# Patient Record
Sex: FEMALE | Race: BLACK OR AFRICAN AMERICAN | NOT HISPANIC OR LATINO | Employment: OTHER | URBAN - NONMETROPOLITAN AREA
[De-identification: names, ages, dates, MRNs, and addresses within clinical notes are randomized per-mention and may not be internally consistent; named-entity substitution may affect disease eponyms.]

---

## 2023-11-17 PROBLEM — I27.20 SEVERE PULMONARY HYPERTENSION (MULTI): Status: ACTIVE | Noted: 2023-11-17

## 2023-11-17 PROBLEM — I08.0 MITRAL VALVE STENOSIS AND AORTIC VALVE STENOSIS: Status: ACTIVE | Noted: 2023-11-17

## 2023-11-17 RX ORDER — CHOLECALCIFEROL (VITAMIN D3) 125 MCG
3000 CAPSULE ORAL
COMMUNITY
End: 2024-02-14 | Stop reason: ALTCHOICE

## 2023-11-17 RX ORDER — VITAMIN B COMPLEX
1 CAPSULE ORAL DAILY
COMMUNITY

## 2023-11-17 RX ORDER — WARFARIN 2 MG/1
2 TABLET ORAL
COMMUNITY
Start: 2023-08-14

## 2023-11-17 RX ORDER — PRAVASTATIN SODIUM 40 MG/1
40 TABLET ORAL NIGHTLY
COMMUNITY

## 2023-11-17 RX ORDER — LOSARTAN POTASSIUM 25 MG/1
25 TABLET ORAL DAILY
COMMUNITY
Start: 2023-03-14

## 2023-11-17 RX ORDER — FUROSEMIDE 20 MG/1
20 TABLET ORAL DAILY
COMMUNITY
Start: 2023-10-11

## 2023-11-17 RX ORDER — ATENOLOL 50 MG/1
100 TABLET ORAL
COMMUNITY
Start: 2023-10-02

## 2024-02-14 ENCOUNTER — OFFICE VISIT (OUTPATIENT)
Dept: CARDIOLOGY | Facility: CLINIC | Age: 84
End: 2024-02-14
Payer: MEDICARE

## 2024-02-14 VITALS
HEIGHT: 61 IN | HEART RATE: 52 BPM | DIASTOLIC BLOOD PRESSURE: 76 MMHG | BODY MASS INDEX: 23.6 KG/M2 | SYSTOLIC BLOOD PRESSURE: 144 MMHG | WEIGHT: 125 LBS

## 2024-02-14 DIAGNOSIS — I27.20 SEVERE PULMONARY HYPERTENSION (MULTI): ICD-10-CM

## 2024-02-14 DIAGNOSIS — I05.0 MITRAL VALVE STENOSIS, MODERATE: Primary | ICD-10-CM

## 2024-02-14 DIAGNOSIS — Z79.01 LONG TERM CURRENT USE OF ANTICOAGULANT THERAPY: ICD-10-CM

## 2024-02-14 DIAGNOSIS — I10 ESSENTIAL HYPERTENSION: ICD-10-CM

## 2024-02-14 PROBLEM — I08.0 MITRAL VALVE STENOSIS AND AORTIC VALVE STENOSIS: Status: RESOLVED | Noted: 2023-11-17 | Resolved: 2024-02-14

## 2024-02-14 PROCEDURE — 1159F MED LIST DOCD IN RCRD: CPT | Performed by: NURSE PRACTITIONER

## 2024-02-14 PROCEDURE — 1036F TOBACCO NON-USER: CPT | Performed by: NURSE PRACTITIONER

## 2024-02-14 PROCEDURE — 1160F RVW MEDS BY RX/DR IN RCRD: CPT | Performed by: NURSE PRACTITIONER

## 2024-02-14 PROCEDURE — 3077F SYST BP >= 140 MM HG: CPT | Performed by: NURSE PRACTITIONER

## 2024-02-14 PROCEDURE — 99213 OFFICE O/P EST LOW 20 MIN: CPT | Performed by: NURSE PRACTITIONER

## 2024-02-14 PROCEDURE — 3078F DIAST BP <80 MM HG: CPT | Performed by: NURSE PRACTITIONER

## 2024-02-14 NOTE — LETTER
"February 19, 2024     Emeterio Arce MD  [address]  [place] [address]    Patient: Oneyda Cormier   YOB: 1940   Date of Visit: 2/14/2024       Dear Dr. Emeterio Arce MD:    Thank you for referring Oneyda Cormier to me for evaluation. Below are my notes for this consultation.  If you have questions, please do not hesitate to call me. I look forward to following your patient along with you.       Sincerely,     Thu Ladd, APRN-CNP      CC: No Recipients  ______________________________________________________________________________________    Chief Complaint  \" Doing fine\"    Reason for Visit  6-month follow-up  Patient presents to the office today for outpatient follow-up for mitral stenosis.  Last evaluated in clinic by Dr. Lopez July 2023.  At that time, added Lasix and Coumadin to medical regimen.  Patient has been compliant.    Presents today ambulatory with steady gait.  Patient denies any hospitalizations or significant changes to interval medical history since last office follow-up.     History of Present Illness   Patient is an extremely pleasant 84-year-old female who appears younger than her stated age.  She presents to the office today reportedly doing\" doing pretty good\".  She continues to be aerobically active where she goes to the gym 3 times a week, she is active in [employer].  She cleans her home and goes to the grocery store.  She denies any shortness of breath, there is no orthopnea or PND.  No dizziness lightheadedness.  No chest pain, no palpitations.    Reviewed mitral stenosis, the indication for anticoagulation.  Is tolerating without bleeding diatheses.  She remains asymptomatic, agrees to surveillance echocardiogram.    Patient reports that overall has no complaint(s) of chest pain, chest pressure/discomfort, dyspnea, exertional chest pressure/discomfort, and fatigue    Daily activity:    Goes to the gym 3 times a week, ADLs.  Grocery store, housework.  Denies any " "change in exercise capacity or functional tolerance since last office visit.    Review of Systems   Cardiovascular:  Negative for chest pain, dyspnea on exertion, irregular heartbeat, leg swelling, near-syncope, orthopnea, palpitations, paroxysmal nocturnal dyspnea and syncope.        Visit Vitals  /76 (BP Location: Left arm, Patient Position: Sitting)   Pulse 52   Ht 1.549 m (5' 1\")   Wt 56.7 kg (125 lb)   BMI 23.62 kg/m²   Smoking Status Never   BSA 1.56 m²     Physical Exam  Vitals and nursing note reviewed.   HENT:      Head: Normocephalic.   Cardiovascular:      Rate and Rhythm: Normal rate and regular rhythm.      Heart sounds: Murmur heard.      Diastolic murmur is present with a grade of 2/4.   Pulmonary:      Effort: Pulmonary effort is normal.      Breath sounds: Normal breath sounds.   Abdominal:      Palpations: Abdomen is soft.   Musculoskeletal:      Right lower leg: No edema.      Left lower leg: No edema.   Skin:     General: Skin is warm and dry.   Neurological:      General: No focal deficit present.      Mental Status: She is alert.   Psychiatric:         Mood and Affect: Mood normal.         Behavior: Behavior normal.    No Known Allergies    Current Outpatient Medications   Medication Instructions   • atenolol (TENORMIN) 100 mg, oral, Daily before breakfast   • b complex vitamins capsule 1 capsule, oral, Daily   • calcium carbonate/vitamin D3 (CALCIUM 600 + D,3, ORAL) oral   • furosemide (LASIX) 20 mg, oral, Daily   • losartan (COZAAR) 25 mg, oral, Daily   • pravastatin (PRAVACHOL) 40 mg, oral, Nightly   • warfarin (COUMADIN) 2 mg, oral      Assessment:    Cardiac and Vasculature  Seen in consult Dr. Pena July 2023 due to abnormal echo:  April 2023 TTE  EF 55 to 60%  Left atrium severely dilated  RV dilated  Mitral valve with moderate mitral stenosis  MR mild/moderate  RVSP 65-70 mmHg    She was initiated on Lasix and Coumadin at that time.    Prior ischemic evaluation  No prior " arrhythmia    Severe pulmonary hypertension (CMS/HCC)  April 2023 RVSP 65 to 70 mmHg    Essential hypertension  Optimal in office    Long term current use of anticoagulant therapy  Anticoagulated on Coumadin due to mitral stenosis  Denies bleeding diatheses    Mitral valve stenosis, moderate  April 2023 TTE  Moderate mitral stenosis peak 13, mean 11    Continues to deny any type of dyspnea on exertion.    Plan:     Through informed decision making process incorporating patients unique circumstances, the following treatment plan will be initiated:    1.  Prescription drug management of cardiovascular medication for efficacy, adherence to treatment, side effect assessment and polypharmacy. Current treatment clinically warranted and to continue without modifications.    2.  Echo in April 2024 to follow up on mitral stenosis    3. Return for follow-up; in the interim, contact the office if new symptoms arise.  Dr. Pena after testing     Thu Ladd  MSN, APRN-CNP, PMHNP-BC  [facility]    Please excuse any errors in grammar or translation related to this dictation. Voice recognition software was utilized to prepare this document.

## 2024-02-14 NOTE — PATIENT INSTRUCTIONS
Please bring all medicines, vitamins, and herbal supplements with you when you come to the office.    Prescriptions will not be filled unless you are compliant with your follow up appointments or have a follow up appointment scheduled as per instruction of your physician. Refills should be requested at the time of your visit.     PLAN:   Through informed decision making process incorporating patients unique circumstances, the following treatment plan will be initiated:    1.  Prescription drug management of cardiovascular medication for efficacy, adherence to treatment, side effect assessment and polypharmacy. Current treatment clinically warranted and to continue without modifications.    2.  Echo in April 2024 to follow up on mitral stenosis    3. Return for follow-up; in the interim, contact the office if new symptoms arise.  Dr. Pena after testing

## 2024-02-19 ASSESSMENT — ENCOUNTER SYMPTOMS
ORTHOPNEA: 0
PND: 0
PALPITATIONS: 0
NEAR-SYNCOPE: 0
DYSPNEA ON EXERTION: 0
SYNCOPE: 0
IRREGULAR HEARTBEAT: 0

## 2024-02-19 NOTE — PROGRESS NOTES
"Chief Complaint  \" Doing fine\"    Reason for Visit  6-month follow-up  Patient presents to the office today for outpatient follow-up for mitral stenosis.  Last evaluated in clinic by Dr. Lopez July 2023.  At that time, added Lasix and Coumadin to medical regimen.  Patient has been compliant.    Presents today ambulatory with steady gait.  Patient denies any hospitalizations or significant changes to interval medical history since last office follow-up.     History of Present Illness   Patient is an extremely pleasant 84-year-old female who appears younger than her stated age.  She presents to the office today reportedly doing\" doing pretty good\".  She continues to be aerobically active where she goes to the gym 3 times a week, she is active in [employer].  She cleans her home and goes to the grocery store.  She denies any shortness of breath, there is no orthopnea or PND.  No dizziness lightheadedness.  No chest pain, no palpitations.    Reviewed mitral stenosis, the indication for anticoagulation.  Is tolerating without bleeding diatheses.  She remains asymptomatic, agrees to surveillance echocardiogram.    Patient reports that overall has no complaint(s) of chest pain, chest pressure/discomfort, dyspnea, exertional chest pressure/discomfort, and fatigue    Daily activity:    Goes to the gym 3 times a week, ADLs.  Grocery store, housework.  Denies any change in exercise capacity or functional tolerance since last office visit.    Review of Systems   Cardiovascular:  Negative for chest pain, dyspnea on exertion, irregular heartbeat, leg swelling, near-syncope, orthopnea, palpitations, paroxysmal nocturnal dyspnea and syncope.        Visit Vitals  /76 (BP Location: Left arm, Patient Position: Sitting)   Pulse 52   Ht 1.549 m (5' 1\")   Wt 56.7 kg (125 lb)   BMI 23.62 kg/m²   Smoking Status Never   BSA 1.56 m²     Physical Exam  Vitals and nursing note reviewed.   HENT:      Head: Normocephalic.   Cardiovascular: "      Rate and Rhythm: Normal rate and regular rhythm.      Heart sounds: Murmur heard.      Diastolic murmur is present with a grade of 2/4.   Pulmonary:      Effort: Pulmonary effort is normal.      Breath sounds: Normal breath sounds.   Abdominal:      Palpations: Abdomen is soft.   Musculoskeletal:      Right lower leg: No edema.      Left lower leg: No edema.   Skin:     General: Skin is warm and dry.   Neurological:      General: No focal deficit present.      Mental Status: She is alert.   Psychiatric:         Mood and Affect: Mood normal.         Behavior: Behavior normal.    No Known Allergies    Current Outpatient Medications   Medication Instructions    atenolol (TENORMIN) 100 mg, oral, Daily before breakfast    b complex vitamins capsule 1 capsule, oral, Daily    calcium carbonate/vitamin D3 (CALCIUM 600 + D,3, ORAL) oral    furosemide (LASIX) 20 mg, oral, Daily    losartan (COZAAR) 25 mg, oral, Daily    pravastatin (PRAVACHOL) 40 mg, oral, Nightly    warfarin (COUMADIN) 2 mg, oral      Assessment:    Cardiac and Vasculature  Seen in consult Dr. Pena July 2023 due to abnormal echo:  April 2023 TTE  EF 55 to 60%  Left atrium severely dilated  RV dilated  Mitral valve with moderate mitral stenosis  MR mild/moderate  RVSP 65-70 mmHg    She was initiated on Lasix and Coumadin at that time.    Prior ischemic evaluation  No prior arrhythmia    Severe pulmonary hypertension (CMS/HCC)  April 2023 RVSP 65 to 70 mmHg    Essential hypertension  Optimal in office    Long term current use of anticoagulant therapy  Anticoagulated on Coumadin due to mitral stenosis  Denies bleeding diatheses    Mitral valve stenosis, moderate  April 2023 TTE  Moderate mitral stenosis peak 13, mean 11    Continues to deny any type of dyspnea on exertion.    Plan:     Through informed decision making process incorporating patients unique circumstances, the following treatment plan will be initiated:    1.  Prescription drug management  of cardiovascular medication for efficacy, adherence to treatment, side effect assessment and polypharmacy. Current treatment clinically warranted and to continue without modifications.    2.  Echo in April 2024 to follow up on mitral stenosis    3. Return for follow-up; in the interim, contact the office if new symptoms arise.  Dr. Pena after testing     Thu Ladd  MSN, APRN-CNP, PMHNP-BC  [facility]    Please excuse any errors in grammar or translation related to this dictation. Voice recognition software was utilized to prepare this document.

## 2024-02-19 NOTE — ASSESSMENT & PLAN NOTE
April 2023 TTE  Moderate mitral stenosis peak 13, mean 11    Continues to deny any type of dyspnea on exertion.

## 2024-02-19 NOTE — ASSESSMENT & PLAN NOTE
Seen in consult Dr. Pena July 2023 due to abnormal echo:  April 2023 TTE  EF 55 to 60%  Left atrium severely dilated  RV dilated  Mitral valve with moderate mitral stenosis  MR mild/moderate  RVSP 65-70 mmHg    She was initiated on Lasix and Coumadin at that time.    Prior ischemic evaluation  No prior arrhythmia

## 2024-04-04 ENCOUNTER — APPOINTMENT (OUTPATIENT)
Dept: CARDIOLOGY | Facility: CLINIC | Age: 84
End: 2024-04-04
Payer: MEDICARE

## 2024-04-05 ENCOUNTER — HOSPITAL ENCOUNTER (OUTPATIENT)
Dept: CARDIOLOGY | Facility: CLINIC | Age: 84
Discharge: HOME | End: 2024-04-05
Payer: MEDICARE

## 2024-04-05 VITALS
SYSTOLIC BLOOD PRESSURE: 136 MMHG | BODY MASS INDEX: 23.6 KG/M2 | DIASTOLIC BLOOD PRESSURE: 80 MMHG | HEIGHT: 61 IN | WEIGHT: 125 LBS

## 2024-04-05 DIAGNOSIS — I27.20 SEVERE PULMONARY HYPERTENSION (MULTI): ICD-10-CM

## 2024-04-05 DIAGNOSIS — I05.0 MITRAL VALVE STENOSIS, MODERATE: ICD-10-CM

## 2024-04-05 LAB
AORTIC VALVE MEAN GRADIENT: 4 MMHG
AORTIC VALVE PEAK VELOCITY: 1.41 M/S
AV PEAK GRADIENT: 8 MMHG
AVA (PEAK VEL): 1.68 CM2
AVA (VTI): 1.45 CM2
EJECTION FRACTION APICAL 4 CHAMBER: 58
LEFT VENTRICLE INTERNAL DIMENSION DIASTOLE: 4.21 CM (ref 3.5–6)
LEFT VENTRICULAR OUTFLOW TRACT DIAMETER: 1.8 CM
MITRAL VALVE E/A RATIO: 0.83
MITRAL VALVE E/E' RATIO: 42.6
RIGHT VENTRICLE PEAK SYSTOLIC PRESSURE: 28.4 MMHG

## 2024-04-05 PROCEDURE — 93306 TTE W/DOPPLER COMPLETE: CPT | Performed by: INTERNAL MEDICINE

## 2024-04-05 PROCEDURE — 93306 TTE W/DOPPLER COMPLETE: CPT

## 2024-04-09 ENCOUNTER — TELEPHONE (OUTPATIENT)
Dept: CARDIOLOGY | Facility: CLINIC | Age: 84
End: 2024-04-09
Payer: MEDICARE

## 2024-04-09 NOTE — TELEPHONE ENCOUNTER
----- Message from SANCHEZ De La Garza sent at 4/9/2024 10:11 AM EDT -----  There have been no significant changes in her echo - mitral stenosis remains moderate  No changes needed at this time  ----- Message -----  From: Clayton, Syngo - Cardiology Results In  Sent: 4/5/2024   5:29 PM EDT  To: SANCHEZ De La Garza

## 2024-04-09 NOTE — TELEPHONE ENCOUNTER
Result Communication    Resulted Orders   Transthoracic Echo Complete   Result Value Ref Range    AV mn grad 4.0 mmHg    AV pk polly 1.41 m/s    LVOT diam 1.80 cm    MV E/A ratio 0.83     MV avg E/e' ratio 42.60     LVIDd 4.21 cm    RVSP 28.4 mmHg    Aortic Valve Area by Continuity of Peak Velocity 1.68 cm2    AV pk grad 8.0 mmHg    Aortic Valve Area by Continuity of VTI 1.45 cm2    LV A4C EF 58.0     Narrative                   [facility]  [address], [address] [address]          Tel [phone] Fax [phone]    TRANSTHORACIC ECHOCARDIOGRAM REPORT       Patient Name:      NEFTALIMARY RUEDA           Reading Physician:    [id number] Kirby Draper MD  Study Date:        4/5/2024              Ordering Provider:    [id number] LEXI MCALLISTER  MRN/PID:           [id number]              Fellow:  Accession#:        [id number]          Nurse:  Date of Birth/Age: 1940 / 84 years  Sonographer:          DOLORES  Gender:            F                     Additional Staff:  Height:            154.94 cm             Admit Date:  Weight:            56.70 kg              Admission Status:  BSA / BMI:         1.55 m2 / 23.62 kg/m2 Department Location:  [facility]  Blood Pressure: 136 /80 mmHg    Study Type:    TRANSTHORACIC ECHO (TTE) COMPLETE  Diagnosis/ICD: 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       PHYSICIAN INTERPRETATION:  Left Ventricle: Left ventricular systolic function is normal, with an estimated ejection fraction of 55-60%. There are no regional wall motion  abnormalities. The left ventricular cavity size is normal. Spectral Doppler shows an impaired relaxation pattern of left ventricular diastolic filling.  Left Atrium: The left atrium is moderate to severely dilated.  Right Ventricle: The right ventricle is normal in size. There is normal right ventricular global systolic function.  Right Atrium: The right atrium is normal in size.  Aortic Valve: The aortic valve is trileaflet. There is evidence of mildly elevated transaortic gradients consistent with sclerosis of the aortic valve.  There is no evidence of aortic valve regurgitation. The peak instantaneous gradient of the aortic valve is 8.0 mmHg. The mean gradient of the aortic valve is 4.0 mmHg.  Mitral Valve: The mitral valve is moderately thickened. There is evidence of moderate mitral valve stenosis. The doppler estimated mean and peak diastolic pressure gradients are 9.0 mmHg and 23.0 mmHg respectively. There is moderate to severe mitral annular calcification. There is mild mitral valve regurgitation.  Tricuspid Valve: The tricuspid valve is structurally normal. There is trace tricuspid regurgitation.  Pulmonic Valve: The pulmonic valve is not well visualized. There is trace pulmonic valve regurgitation.  Pericardium: There is no pericardial effusion noted.  Aorta: The aortic root is normal.       CONCLUSIONS:   1. Left ventricular systolic function is normal with a 55-60% estimated ejection fraction.   2. Spectral Doppler shows an impaired relaxation pattern of left ventricular diastolic filling.   3. The left atrium is moderate to severely dilated.   4. The mitral valve is moderately thickened.   5. There is moderate to severe mitral annular calcification.   6. Aortic valve sclerosis.    QUANTITATIVE DATA SUMMARY:  2D MEASUREMENTS:                           Normal Ranges:  Ao Root d:     2.60 cm   (2.0-3.7cm)  LAs:           3.90 cm   (2.7-4.0cm)  RVIDd:         2.11 cm   (0.9-3.6cm)  IVSd:          1.11 cm    (0.6-1.1cm)  LVPWd:         0.87 cm   (0.6-1.1cm)  LVIDd:         4.21 cm   (3.9-5.9cm)  LVIDs:         2.64 cm  LV Mass Index: 88.0 g/m2  LV % FS        37.3 %    LV SYSTOLIC FUNCTION BY 2D PLANIMETRY (MOD):                      Normal Ranges:  EF-A4C View: 58.0 % (>=55%)    LV DIASTOLIC FUNCTION:                         Normal Ranges:  MV Peak E:    1.56 m/s (0.7-1.2 m/s)  MV Peak A:    1.89 m/s (0.42-0.7 m/s)  E/A Ratio:    0.83     (1.0-2.2)  MV lateral e' 0.04 m/s  MV medial e'  0.04 m/s  E/e' Ratio:   42.60    (<8.0)    MITRAL VALVE:                        Normal Ranges:  MV Vmax:    2.40 m/s  (<=1.3m/s)  MV peak P.0 mmHg (<5mmHg)  MV mean P.0 mmHg  (<48mmHg)    MITRAL INSUFFICIENCY:                       Normal Ranges:  MR Vmax: 472.00 cm/s  dP/dt:   1894 mmHg/s (>1200mmHg/sec)    AORTIC VALVE:                                    Normal Ranges:  AoV Vmax:                1.41 m/s (<=1.7m/s)  AoV Peak P.0 mmHg (<20mmHg)  AoV Mean P.0 mmHg (1.7-11.5mmHg)  LVOT Max Harry:            0.93 m/s (<=1.1m/s)  AoV VTI:                 31.40 cm (18-25cm)  LVOT VTI:                17.90 cm  LVOT Diameter:           1.80 cm  (1.8-2.4cm)  AoV Area, VTI:           1.45 cm2 (2.5-5.5cm2)  AoV Area,Vmax:           1.68 cm2 (2.5-4.5cm2)  AoV Dimensionless Index: 0.57    TRICUSPID VALVE/RVSP:                              Normal Ranges:  Peak TR Velocity: 2.52 m/s  RV Syst Pressure: 28.4 mmHg (< 30mmHg)    PULMONIC VALVE:                        Normal Ranges:  PV Max Harry: 0.6 m/s   (0.6-0.9m/s)  PV Max P.7 mmHg  PIEDV:      2.31 m/s  PADP:       24.3 mmHg       [id number] Kirby Draper MD  Electronically signed on 2024 at 5:29:18 PM         ** Final **         3:19 PM      Attempted to phone patient. No answer.  Unable to leave message. To [facility]  to try again.

## 2024-04-15 ENCOUNTER — OFFICE VISIT (OUTPATIENT)
Dept: CARDIOLOGY | Facility: CLINIC | Age: 84
End: 2024-04-15
Payer: MEDICARE

## 2024-04-15 VITALS
HEIGHT: 62 IN | DIASTOLIC BLOOD PRESSURE: 64 MMHG | BODY MASS INDEX: 23 KG/M2 | SYSTOLIC BLOOD PRESSURE: 112 MMHG | WEIGHT: 125 LBS | HEART RATE: 58 BPM

## 2024-04-15 DIAGNOSIS — Z79.01 LONG TERM CURRENT USE OF ANTICOAGULANT THERAPY: ICD-10-CM

## 2024-04-15 DIAGNOSIS — I27.20 SEVERE PULMONARY HYPERTENSION (MULTI): ICD-10-CM

## 2024-04-15 DIAGNOSIS — Z78.9 NEVER SMOKED CIGARETTES: ICD-10-CM

## 2024-04-15 DIAGNOSIS — I05.0 MITRAL VALVE STENOSIS, MODERATE: ICD-10-CM

## 2024-04-15 PROCEDURE — 1159F MED LIST DOCD IN RCRD: CPT | Performed by: INTERNAL MEDICINE

## 2024-04-15 PROCEDURE — 3074F SYST BP LT 130 MM HG: CPT | Performed by: INTERNAL MEDICINE

## 2024-04-15 PROCEDURE — 3078F DIAST BP <80 MM HG: CPT | Performed by: INTERNAL MEDICINE

## 2024-04-15 PROCEDURE — 99214 OFFICE O/P EST MOD 30 MIN: CPT | Performed by: INTERNAL MEDICINE

## 2024-04-15 PROCEDURE — 1160F RVW MEDS BY RX/DR IN RCRD: CPT | Performed by: INTERNAL MEDICINE

## 2024-04-15 PROCEDURE — 1036F TOBACCO NON-USER: CPT | Performed by: INTERNAL MEDICINE

## 2024-04-15 NOTE — PROGRESS NOTES
"Subjective   Oneyda Cormier is a 84 y.o. female       Chief Complaint    Follow-up          HPI     Review of Systems   All other systems reviewed and are negative.     Patient returns in follow-up of problems as noted.  In the interim she underwent an echocardiogram.  It demonstrates at least moderate mitral stenosis.  Despite this she is asymptomatic.  She works out in the gym and walks briskly and has no orthopnea PND or dyspnea exertion.  Because of this I believe intervention is not indicated at this time.  This was explained to her in detail and she concurs.    I do advocate, though, the continued use of warfarin to mitigate potential stroke risk associated with her mitral valve disease and because of this she will continue.      Vitals:    04/15/24 0924   BP: 112/64   BP Location: Left arm   Patient Position: Sitting   Pulse: 58   Weight: 56.7 kg (125 lb)   Height: 1.575 m (5' 2\")        Objective   Physical Exam  Constitutional:       Appearance: Normal appearance.   HENT:      Nose: Nose normal.   Neck:      Vascular: No carotid bruit.   Cardiovascular:      Rate and Rhythm: Normal rate.      Pulses: Normal pulses.      Heart sounds: Normal heart sounds.   Pulmonary:      Effort: Pulmonary effort is normal.   Abdominal:      General: Bowel sounds are normal.      Palpations: Abdomen is soft.   Musculoskeletal:         General: Normal range of motion.      Cervical back: Normal range of motion.      Right lower leg: No edema.      Left lower leg: No edema.   Skin:     General: Skin is warm and dry.   Neurological:      General: No focal deficit present.      Mental Status: She is alert.   Psychiatric:         Mood and Affect: Mood normal.         Behavior: Behavior normal.         Thought Content: Thought content normal.         Judgment: Judgment normal.         Allergies  Patient has no known allergies.     Current Medications    Current Outpatient Medications:     atenolol (Tenormin) 50 mg tablet, Take 2 " tablets (100 mg) by mouth once daily in the morning. Take before meals., Disp: , Rfl:     b complex vitamins capsule, Take 1 capsule by mouth once daily., Disp: , Rfl:     calcium carbonate/vitamin D3 (CALCIUM 600 + D,3, ORAL), Take by mouth., Disp: , Rfl:     furosemide (Lasix) 20 mg tablet, Take 1 tablet (20 mg) by mouth once daily., Disp: , Rfl:     losartan (Cozaar) 25 mg tablet, Take 1 tablet (25 mg) by mouth once daily., Disp: , Rfl:     pravastatin (Pravachol) 40 mg tablet, Take 1 tablet (40 mg) by mouth once daily at bedtime., Disp: , Rfl:     warfarin (Coumadin) 2 mg tablet, Take 1 tablet (2 mg) by mouth., Disp: , Rfl:                      Assessment/Plan     1. Mitral valve stenosis, moderate  Patient's mitral valve disease is asymptomatic and hence we will follow clinically.  An echocardiogram will be done in follow-up next year and she will see one of my partners thereafter    - Follow Up In Cardiology    2. Severe pulmonary hypertension (Multi)  Pulmonary hypertension probably on the basis of her mitral valve disease.  Attenuated somewhat by diuretic therapy.  - Follow Up In Cardiology    3. Long term current use of anticoagulant therapy  Anticoagulant therapy recommended because of mitral valve disease and she is willing to comply.    4. Never smoked cigarettes  Noted        Scribe Attestation  By signing my name below, Adri COBB LPN, Scribe   attest that this documentation has been prepared under the direction and in the presence of Kirby Pena MD.     Provider Attestation - Scribe documentation    All medical record entries made by the Scribe were at my direction and personally dictated by me. I have reviewed the chart and agree that the record accurately reflects my personal performance of the history, physical exam, discussion and plan.

## 2024-04-15 NOTE — LETTER
"April 15, 2024     Emeterio Arce MD  [address]  [facility] [address]    Patient: Oneyda Cormier   YOB: 1940   Date of Visit: 4/15/2024       Dear Dr. Emeterio Arce MD:    Thank you for referring Oneyda Cormier to me for evaluation. Below are my notes for this consultation.  If you have questions, please do not hesitate to call me. I look forward to following your patient along with you.       Sincerely,     Kirby Pena MD      CC: No Recipients  ______________________________________________________________________________________    Subjective   Oneyda Cormier is a 84 y.o. female       Chief Complaint    Follow-up          HPI     Review of Systems   All other systems reviewed and are negative.     Patient returns in follow-up of problems as noted.  In the interim she underwent an echocardiogram.  It demonstrates at least moderate mitral stenosis.  Despite this she is asymptomatic.  She works out in the gym and walks briskly and has no orthopnea PND or dyspnea exertion.  Because of this I believe intervention is not indicated at this time.  This was explained to her in detail and she concurs.    I do advocate, though, the continued use of warfarin to mitigate potential stroke risk associated with her mitral valve disease and because of this she will continue.      Vitals:    04/15/24 0924   BP: 112/64   BP Location: Left arm   Patient Position: Sitting   Pulse: 58   Weight: 56.7 kg (125 lb)   Height: 1.575 m (5' 2\")        Objective   Physical Exam  Constitutional:       Appearance: Normal appearance.   HENT:      Nose: Nose normal.   Neck:      Vascular: No carotid bruit.   Cardiovascular:      Rate and Rhythm: Normal rate.      Pulses: Normal pulses.      Heart sounds: Normal heart sounds.   Pulmonary:      Effort: Pulmonary effort is normal.   Abdominal:      General: Bowel sounds are normal.      Palpations: Abdomen is soft.   Musculoskeletal:         General: Normal range of motion. "      Cervical back: Normal range of motion.      Right lower leg: No edema.      Left lower leg: No edema.   Skin:     General: Skin is warm and dry.   Neurological:      General: No focal deficit present.      Mental Status: She is alert.   Psychiatric:         Mood and Affect: Mood normal.         Behavior: Behavior normal.         Thought Content: Thought content normal.         Judgment: Judgment normal.         Allergies  Patient has no known allergies.     Current Medications    Current Outpatient Medications:   •  atenolol (Tenormin) 50 mg tablet, Take 2 tablets (100 mg) by mouth once daily in the morning. Take before meals., Disp: , Rfl:   •  b complex vitamins capsule, Take 1 capsule by mouth once daily., Disp: , Rfl:   •  calcium carbonate/vitamin D3 (CALCIUM 600 + D,3, ORAL), Take by mouth., Disp: , Rfl:   •  furosemide (Lasix) 20 mg tablet, Take 1 tablet (20 mg) by mouth once daily., Disp: , Rfl:   •  losartan (Cozaar) 25 mg tablet, Take 1 tablet (25 mg) by mouth once daily., Disp: , Rfl:   •  pravastatin (Pravachol) 40 mg tablet, Take 1 tablet (40 mg) by mouth once daily at bedtime., Disp: , Rfl:   •  warfarin (Coumadin) 2 mg tablet, Take 1 tablet (2 mg) by mouth., Disp: , Rfl:                      Assessment/Plan     1. Mitral valve stenosis, moderate  Patient's mitral valve disease is asymptomatic and hence we will follow clinically.  An echocardiogram will be done in follow-up next year and she will see one of my partners thereafter    - Follow Up In Cardiology    2. Severe pulmonary hypertension (Multi)  Pulmonary hypertension probably on the basis of her mitral valve disease.  Attenuated somewhat by diuretic therapy.  - Follow Up In Cardiology    3. Long term current use of anticoagulant therapy  Anticoagulant therapy recommended because of mitral valve disease and she is willing to comply.    4. Never smoked cigarettes  Noted        Scribe Attestation  By signing my name below, Adri COBB LPN,  Scribe   attest that this documentation has been prepared under the direction and in the presence of Kirby Pena MD.     Provider Attestation - Scribe documentation    All medical record entries made by the Scribe were at my direction and personally dictated by me. I have reviewed the chart and agree that the record accurately reflects my personal performance of the history, physical exam, discussion and plan.

## 2024-04-19 ENCOUNTER — APPOINTMENT (OUTPATIENT)
Dept: CARDIOLOGY | Facility: CLINIC | Age: 84
End: 2024-04-19
Payer: MEDICARE

## 2025-04-07 ENCOUNTER — HOSPITAL ENCOUNTER (OUTPATIENT)
Dept: CARDIOLOGY | Facility: CLINIC | Age: 85
Discharge: HOME | End: 2025-04-07
Payer: MEDICARE

## 2025-04-07 VITALS
HEIGHT: 62 IN | BODY MASS INDEX: 23 KG/M2 | WEIGHT: 125 LBS | DIASTOLIC BLOOD PRESSURE: 62 MMHG | SYSTOLIC BLOOD PRESSURE: 118 MMHG

## 2025-04-07 DIAGNOSIS — I05.0 MITRAL VALVE STENOSIS, MODERATE: ICD-10-CM

## 2025-04-07 LAB
AORTIC VALVE MEAN GRADIENT: 6 MMHG
AORTIC VALVE PEAK VELOCITY: 1.62 M/S
AV PEAK GRADIENT: 10 MMHG
AVA (PEAK VEL): 1.23 CM2
AVA (VTI): 1.32 CM2
EJECTION FRACTION APICAL 4 CHAMBER: 74.3
EJECTION FRACTION: 60 %
LEFT ATRIUM VOLUME AREA LENGTH INDEX BSA: 45.8 ML/M2
LEFT VENTRICLE INTERNAL DIMENSION DIASTOLE: 4.82 CM (ref 3.5–6)
LEFT VENTRICULAR OUTFLOW TRACT DIAMETER: 1.91 CM
LV EJECTION FRACTION BIPLANE: 63 %
MITRAL VALVE E/A RATIO: 1.27
MITRAL VALVE E/E' RATIO: 54.2
RIGHT VENTRICLE FREE WALL PEAK S': 6.87 CM/S
RIGHT VENTRICLE PEAK SYSTOLIC PRESSURE: 36.1 MMHG

## 2025-04-07 PROCEDURE — 93306 TTE W/DOPPLER COMPLETE: CPT | Performed by: INTERNAL MEDICINE

## 2025-04-07 PROCEDURE — 93306 TTE W/DOPPLER COMPLETE: CPT

## 2025-04-08 ENCOUNTER — TELEPHONE (OUTPATIENT)
Dept: CARDIOLOGY | Facility: CLINIC | Age: 85
End: 2025-04-08
Payer: MEDICARE

## 2025-04-08 NOTE — TELEPHONE ENCOUNTER
Result Communication    Resulted Orders   Transthoracic Echo Complete   Result Value Ref Range    AV mn grad 6 mmHg    AV pk polly 1.62 m/s    LV Biplane EF 63 %    LVOT diam 1.91 cm    MV E/A ratio 1.27     MV avg E/e' ratio 54.20     LA vol index A/L 45.8 ml/m2    LV EF 60 %    RV free wall pk S' 6.87 cm/s    RVSP 36.1 mmHg    LVIDd 4.82 cm    Aortic Valve Area by Continuity of Peak Velocity 1.23 cm2    AV pk grad 10 mmHg    Aortic Valve Area by Continuity of VTI 1.32 cm2    LV A4C EF 74.3     Narrative                   [facility]  [address], [address], [place] [address]          Tel [phone] Fax [phone]    TRANSTHORACIC ECHOCARDIOGRAM REPORT    Patient Name:        NEFTALI Alegre Physician:    [id number] Alexey Garza MD  Study Date:          4/7/2025             Ordering Provider:    [id number] DEREK CONSTANTINO  MRN/PID:             [id number]             Fellow:  Accession#:          [id number]         Nurse:  Date of Birth/Age:   1940 / 85 years Sonographer:          Joyce Mar RDCS, RVT  Gender Assigned at   F                    Additional Staff:  Birth:  Height:              157.48 cm            Admit Date:  Weight:              56.70 kg             Admission Status:  BSA / BMI:           1.57 m2 / 22.86      Department Location:  [facility]                       kg/m2                                      [facility] [place]  Blood Pressure: 118 /62 mmHg    Study Type:    TRANSTHORACIC ECHO (TTE) COMPLETE  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       PHYSICIAN  INTERPRETATION:  Left Ventricle: The left ventricular systolic function is normal, with a visually estimated ejection fraction of 60%. There are no regional wall motion abnormalities. The left ventricular cavity size is normal. There is normal septal and normal posterior left ventricular wall thickness. Spectral Doppler shows a normal pattern of left ventricular diastolic filling.  Left Atrium: The left atrial size is moderate to severely dilated.  Right Ventricle: The right ventricle is normal in size. There is mildly reduced right ventricular systolic function.  Right Atrium: The right atrial size is normal.  Aortic Valve: The aortic valve is trileaflet. The aortic valve dimensionless index is 0.46. There is no evidence of aortic valve regurgitation. The peak instantaneous gradient of the aortic valve is 10 mmHg. The mean gradient of the aortic valve is 6 mmHg.  Mitral Valve: The mitral valve is moderately thickened. There is mild to moderate mitral annular calcification. The peak instantaneous gradient of the mitral valve is 20 mmHg. There is mild to moderate mitral valve regurgitation. The mitral valve appearance consistent with mitral stenosis with restriction and calcification of the mitral valve leaflet with Anabelle appearance of the anterior mitral valve leaflet. The peak gradient measured around 20 mmHg. Mean gradient around 9 mmHg consistent with moderate mitral stenosis.  Tricuspid Valve: The tricuspid valve is structurally normal. There is mild tricuspid regurgitation. The Doppler estimated RVSP is mildly elevated right ventricular systolic pressure at 36.1 mmHg.  Pulmonic Valve: The pulmonic valve is structurally normal. There is mild pulmonic valve regurgitation.  Pericardium: No pericardial effusion noted.  Aorta: The aortic root is normal.       CONCLUSIONS:   1. The left ventricular systolic function is normal, with a visually estimated ejection fraction of 60%.   2. There is mildly reduced right  ventricular systolic function.   3. The left atrial size is moderate to severely dilated.   4. The mitral valve is moderately thickened.   5. The mitral valve appearance consistent with mitral stenosis with restriction and calcification of the mitral valve leaflet with Anabelle appearance of the anterior mitral valve leaflet. The peak gradient measured around 20 mmHg. Mean gradient around 9 mmHg consistent with moderate mitral stenosis.   6. Mild to moderate mitral valve regurgitation.   7. Mildly elevated right ventricular systolic pressure.   8. The study suggest the mitral stenosis in the moderate to severe range at rest. If patient symptomatic considering stress echo or dobutamine stress echo for better assessment of the mitral valve stenosis.    QUANTITATIVE DATA SUMMARY:     2D MEASUREMENTS:             Normal Ranges:  Ao Root s:       2.20 cm  LAs:             4.14 cm     (2.7-4.0cm)  RVIDd:           2.58 cm     (0.9-3.6cm)  IVSd:            0.79 cm     (0.6-1.1cm)  LVPWd:           0.84 cm     (0.6-1.1cm)  LVIDd:           4.82 cm     (3.9-5.9cm)  LVIDs:           2.69 cm  LV Mass Index:   83.4 g/m2  LVEDV Index:     30.41 ml/m2  LV % FS          44.1 %       LEFT ATRIUM:                 Normal Ranges:  LA Vol A4C:       81.3 ml    (22+/-6mL/m2)  LA Vol A2C:       61.0 ml  LA Vol BP:        71.6 ml  LA Vol Index A4C: 51.9ml/m2  LA Vol Index A2C: 39.0 ml/m2  LA Vol Index BP:  45.8 ml/m2  LA Vol A4C:       76.0 ml  LA Vol A2C:       58.6 ml  LA Vol Index BSA: 43.0 ml/m2       LV SYSTOLIC FUNCTION:                       Normal Ranges:  EF-A4C View:    74 % (>=55%)  EF-A2C View:    49 %  EF-Biplane:     63 %  EF-Visual:      60 %  LV EF Reported: 60 %       LV DIASTOLIC FUNCTION:           Normal Ranges:  MV Peak E:             1.81 m/s  (0.7-1.2 m/s)  MV Peak A:             1.42 m/s  (0.42-0.7 m/s)  E/A Ratio:             1.27      (1.0-2.2)  MV e'                  0.033 m/s (>8.0)  MV lateral e'          0.03  m/s  MV medial e'           0.03 m/s  E/e' Ratio:            54.20     (<8.0)       MITRAL VALVE:           Normal Ranges:  MV Vmax:      2.25 m/s  (<=1.3m/s)  MV peak P.2 mmHg (<5mmHg)  MV mean P.0 mmHg  (<48mmHg)  MV VTI:       90.75 cm  (10-13cm)  MV DT:        624 msec  (150-240msec)       MITRAL INSUFFICIENCY:             Normal Ranges:  MR Vmax:              495.86 cm/s  dP/dt:                1291 mmHg/s (>1200mmHg/sec)       AORTIC VALVE:                      Normal Ranges:  AoV Vmax:                1.62 m/s  (<=1.7m/s)  AoV Peak PG:             10.4 mmHg (<20mmHg)  AoV Mean P.7 mmHg  (1.7-11.5mmHg)  LVOT Max Harry:            0.69 m/s  (<=1.1m/s)  AoV VTI:                 39.77 cm  (18-25cm)  LVOT VTI:                18.30 cm  LVOT Diameter:           1.91 cm   (1.8-2.4cm)  AoV Area, VTI:           1.32 cm2  (2.5-5.5cm2)  AoV Area,Vmax:           1.23 cm2  (2.5-4.5cm2)  AoV Dimensionless Index: 0.46       RIGHT VENTRICLE:  RV Basal 4.14 cm  RV Mid   2.90 cm  RV Major 4.5 cm  RV s'    0.07 m/s       TRICUSPID VALVE/RVSP:          Normal Ranges:  Peak TR Velocity:     2.87 m/s  RV Syst Pressure:     36 mmHg  (< 30mmHg)       PULMONIC VALVE:          Normal Ranges:  RVOT Vmax:      0.60 m/s (0.6-0.9m/s)       AORTA:  Asc Ao Diam 2.67 cm       [id number] Alexey Garza MD  Electronically signed on 2025 at 12:39:16 PM         ** Final **         4:31 PM      Results were successfully communicated with the patient and they acknowledged their understanding.

## 2025-04-08 NOTE — TELEPHONE ENCOUNTER
----- Message from Alexey Garza sent at 4/8/2025 11:20 AM EDT -----  Abnormal echo...former patient of Dr Draper.Advise to keep next appointment with me to review  ----- Message -----  From: SANCHEZ De La Garza  Sent: 4/8/2025   9:34 AM EDT  To: Alexey Garza MD    Annual echo on Dr. Draper patient you will be seeing next week.  I have not seen her ( I get Dr. Draper ordered testing by default)  ----- Message -----  From: Clayton, Syng[facility] - Cardiology Results In  Sent: 4/7/2025  12:39 PM EDT  To: SANCHEZ De La Garza

## 2025-04-15 ENCOUNTER — TELEPHONE (OUTPATIENT)
Dept: CARDIOLOGY | Facility: CLINIC | Age: 85
End: 2025-04-15

## 2025-04-15 ENCOUNTER — APPOINTMENT (OUTPATIENT)
Dept: CARDIOLOGY | Facility: CLINIC | Age: 85
End: 2025-04-15
Payer: MEDICARE

## 2025-04-15 VITALS
BODY MASS INDEX: 23.41 KG/M2 | WEIGHT: 124 LBS | HEIGHT: 61 IN | SYSTOLIC BLOOD PRESSURE: 108 MMHG | DIASTOLIC BLOOD PRESSURE: 46 MMHG | HEART RATE: 58 BPM

## 2025-04-15 DIAGNOSIS — R60.9 EDEMA, UNSPECIFIED TYPE: ICD-10-CM

## 2025-04-15 DIAGNOSIS — Z78.9 NEVER SMOKED CIGARETTES: ICD-10-CM

## 2025-04-15 DIAGNOSIS — I10 ESSENTIAL HYPERTENSION: ICD-10-CM

## 2025-04-15 DIAGNOSIS — Z79.01 LONG TERM CURRENT USE OF ANTICOAGULANT THERAPY: ICD-10-CM

## 2025-04-15 DIAGNOSIS — I27.20 SEVERE PULMONARY HYPERTENSION (MULTI): ICD-10-CM

## 2025-04-15 DIAGNOSIS — I05.0 MITRAL VALVE STENOSIS, MODERATE: Primary | ICD-10-CM

## 2025-04-15 PROCEDURE — G2211 COMPLEX E/M VISIT ADD ON: HCPCS | Performed by: INTERNAL MEDICINE

## 2025-04-15 PROCEDURE — 1160F RVW MEDS BY RX/DR IN RCRD: CPT | Performed by: INTERNAL MEDICINE

## 2025-04-15 PROCEDURE — 1036F TOBACCO NON-USER: CPT | Performed by: INTERNAL MEDICINE

## 2025-04-15 PROCEDURE — 3078F DIAST BP <80 MM HG: CPT | Performed by: INTERNAL MEDICINE

## 2025-04-15 PROCEDURE — 3074F SYST BP LT 130 MM HG: CPT | Performed by: INTERNAL MEDICINE

## 2025-04-15 PROCEDURE — 93000 ELECTROCARDIOGRAM COMPLETE: CPT | Performed by: INTERNAL MEDICINE

## 2025-04-15 PROCEDURE — 1159F MED LIST DOCD IN RCRD: CPT | Performed by: INTERNAL MEDICINE

## 2025-04-15 PROCEDURE — 99214 OFFICE O/P EST MOD 30 MIN: CPT | Performed by: INTERNAL MEDICINE

## 2025-04-15 RX ORDER — FUROSEMIDE 20 MG/1
20 TABLET ORAL AS NEEDED
Qty: 90 TABLET | Refills: 1 | Status: SHIPPED | OUTPATIENT
Start: 2025-04-15 | End: 2026-04-15

## 2025-04-15 ASSESSMENT — ENCOUNTER SYMPTOMS: DIZZINESS: 1

## 2025-04-15 NOTE — TELEPHONE ENCOUNTER
Pt phones following OV today and states she is not taking lasix even though it was listed on her med list. Pt inquiring if Dr. Alexey Garza MD would like for her to take this med.    If so, will need rx sent to [facility]     Please advise

## 2025-04-15 NOTE — PROGRESS NOTES
Chief Complaint   Patient presents with    Follow-up     1 year  for mitral valve stenosis moderate       Subjective   Oneyda Cormier is a 85 y.o. female     HPI   Patient is here for follow-up continue management for her mitral valve disease.  She is a former patient of Dr. Pena.  She is known to have history of moderate mitral stenosis and pulmonary hypertension.  Since last time she was seen by Dr. Pena she denies complaint of chest pain, palpitation, or shortness of breath described minor dizziness.  She had no syncope.  She is ariadne functional class II.  Her recent echocardiogram showed moderate to severe mitral stenosis and severe range pulmonary hypertension.  She was placed by Dr. Pena on anticoagulation because of concern about stroke associated with mitral stenosis.  The patient denies change in cardiac status or symptoms.  She is 85 remains reasonably active.    Assessment    1.  Moderate severe mitral stenosis likely to be degenerative  2.  Close is severe range severe range pulmonary hypertension likely due to mitral stenosis  3.  Patient anticoagulated to mitigate the risk of stroke associate with mitral stenosis this was advised by Dr. Pena in the past  4.  Essential hypertension controlled  5.  Hyperlipidemia on pravastatin reasonably controlled based on recent lab    Plan    1.  I reviewed with patient results of her echocardiogram.  The possibility of her mitral stenosis being severe causing the pulmonary hypertension were discussed.  The patient interestingly report functional class I and remains active.  The possibility of hemodynamic assessment like stress echo discussed for the time being considering the patient age and lack of symptoms she elected to remain on the same therapy with close observation  2.  I advised the patient 25 if he develop any shortness of breath and lower extremity edema  3.  I reviewed the result of her recent lab which reviewed through care everywhere  4.  Risk,  "benefit and alternative anticoagulation reviewed with patient at length she understood and agreed  Review of Systems   Neurological:  Positive for dizziness.   All other systems reviewed and are negative.       EKG done in office today      Vitals:    04/15/25 1328   BP: (!) 108/46   BP Location: Left arm   Patient Position: Sitting   Pulse: 58   Weight: 56.2 kg (124 lb)   Height: 1.549 m (5' 1\")        Objective   Physical Exam  Constitutional:       Appearance: Normal appearance.   HENT:      Nose: Nose normal.   Neck:      Vascular: No carotid bruit.   Cardiovascular:      Rate and Rhythm: Normal rate.      Pulses: Normal pulses.      Heart sounds: Murmur heard.      Diastolic murmur is present with a grade of 2/4.   Pulmonary:      Effort: Pulmonary effort is normal.   Abdominal:      General: Bowel sounds are normal.      Palpations: Abdomen is soft.   Musculoskeletal:         General: Normal range of motion.      Cervical back: Normal range of motion.      Right lower leg: No edema.      Left lower leg: No edema.   Skin:     General: Skin is warm and dry.   Neurological:      General: No focal deficit present.      Mental Status: She is alert.   Psychiatric:         Mood and Affect: Mood normal.         Behavior: Behavior normal.         Thought Content: Thought content normal.         Judgment: Judgment normal.         Allergies  Grass pollen and Monosodium glutamate     Current Medications    Current Outpatient Medications:     atenolol (Tenormin) 50 mg tablet, Take 2 tablets (100 mg) by mouth once daily in the morning. Take before meals., Disp: , Rfl:     b complex vitamins capsule, Take 1 capsule by mouth once daily., Disp: , Rfl:     calcium carbonate/vitamin D3 (CALCIUM 600 + D,3, ORAL), Take by mouth., Disp: , Rfl:     furosemide (Lasix) 20 mg tablet, Take 1 tablet (20 mg) by mouth once daily., Disp: , Rfl:     losartan (Cozaar) 25 mg tablet, Take 1 tablet (25 mg) by mouth once daily., Disp: , Rfl:     " pravastatin (Pravachol) 40 mg tablet, Take 1 tablet (40 mg) by mouth once daily at bedtime., Disp: , Rfl:     warfarin (Coumadin) 2 mg tablet, Take 1 tablet (2 mg) by mouth., Disp: , Rfl:                      Assessment/Plan   1. Mitral valve stenosis, moderate  Follow Up In Cardiology    Follow Up In Cardiology    ECG 12 Lead      2. Severe pulmonary hypertension (Multi)        3. Essential hypertension        4. Long term current use of anticoagulant therapy        5. BMI 23.0-23.9, adult        6. Never smoked cigarettes                 Scribe Attestation  By signing my name below, ISara LPN, Scribe   attest that this documentation has been prepared under the direction and in the presence of Alexey Garza MD.     Provider Attestation - Scribe documentation    All medical record entries made by the Scribe were at my direction and personally dictated by me. I have reviewed the chart and agree that the record accurately reflects my personal performance of the history, physical exam, discussion and plan.

## 2025-04-15 NOTE — LETTER
April 15, 2025     Emeterio Arce MD  [address]  [place] [address]    Patient: Oneyda Cormier   YOB: 1940   Date of Visit: 4/15/2025       Dear Dr. Emeterio Arce MD:    Thank you for referring Oneyda Cormier to me for evaluation. Below are my notes for this consultation.  If you have questions, please do not hesitate to call me. I look forward to following your patient along with you.       Sincerely,     Alexey Garza MD      CC: No Recipients  ______________________________________________________________________________________    Chief Complaint   Patient presents with   • Follow-up     1 year  for mitral valve stenosis moderate       Subjective   Oneyda Cormier is a 85 y.o. female     HPI   Patient is here for follow-up continue management for her mitral valve disease.  She is a former patient of Dr. Pena.  She is known to have history of moderate mitral stenosis and pulmonary hypertension.  Since last time she was seen by Dr. Pena she denies complaint of chest pain, palpitation, or shortness of breath described minor dizziness.  She had no syncope.  She is ariadne functional class II.  Her recent echocardiogram showed moderate to severe mitral stenosis and severe range pulmonary hypertension.  She was placed by Dr. Pena on anticoagulation because of concern about stroke associated with mitral stenosis.  The patient denies change in cardiac status or symptoms.  She is 85 remains reasonably active.    Assessment    1.  Moderate severe mitral stenosis likely to be degenerative  2.  Close is severe range severe range pulmonary hypertension likely due to mitral stenosis  3.  Patient anticoagulated to mitigate the risk of stroke associate with mitral stenosis this was advised by Dr. Pena in the past  4.  Essential hypertension controlled  5.  Hyperlipidemia on pravastatin reasonably controlled based on recent lab    Plan    1.  I reviewed with patient results of her  "echocardiogram.  The possibility of her mitral stenosis being severe causing the pulmonary hypertension were discussed.  The patient interestingly report functional class I and remains active.  The possibility of hemodynamic assessment like stress echo discussed for the time being considering the patient age and lack of symptoms she elected to remain on the same therapy with close observation  2.  I advised the patient 25 if he develop any shortness of breath and lower extremity edema  3.  I reviewed the result of her recent lab which reviewed through care everywhere  4.  Risk, benefit and alternative anticoagulation reviewed with patient at length she understood and agreed  Review of Systems   Neurological:  Positive for dizziness.   All other systems reviewed and are negative.       EKG done in office today      Vitals:    04/15/25 1328   BP: (!) 108/46   BP Location: Left arm   Patient Position: Sitting   Pulse: 58   Weight: 56.2 kg (124 lb)   Height: 1.549 m (5' 1\")        Objective   Physical Exam  Constitutional:       Appearance: Normal appearance.   HENT:      Nose: Nose normal.   Neck:      Vascular: No carotid bruit.   Cardiovascular:      Rate and Rhythm: Normal rate.      Pulses: Normal pulses.      Heart sounds: Murmur heard.      Diastolic murmur is present with a grade of 2/4.   Pulmonary:      Effort: Pulmonary effort is normal.   Abdominal:      General: Bowel sounds are normal.      Palpations: Abdomen is soft.   Musculoskeletal:         General: Normal range of motion.      Cervical back: Normal range of motion.      Right lower leg: No edema.      Left lower leg: No edema.   Skin:     General: Skin is warm and dry.   Neurological:      General: No focal deficit present.      Mental Status: She is alert.   Psychiatric:         Mood and Affect: Mood normal.         Behavior: Behavior normal.         Thought Content: Thought content normal.         Judgment: Judgment normal.         Allergies  Grass " pollen and Monosodium glutamate     Current Medications    Current Outpatient Medications:   •  atenolol (Tenormin) 50 mg tablet, Take 2 tablets (100 mg) by mouth once daily in the morning. Take before meals., Disp: , Rfl:   •  b complex vitamins capsule, Take 1 capsule by mouth once daily., Disp: , Rfl:   •  calcium carbonate/vitamin D3 (CALCIUM 600 + D,3, ORAL), Take by mouth., Disp: , Rfl:   •  furosemide (Lasix) 20 mg tablet, Take 1 tablet (20 mg) by mouth once daily., Disp: , Rfl:   •  losartan (Cozaar) 25 mg tablet, Take 1 tablet (25 mg) by mouth once daily., Disp: , Rfl:   •  pravastatin (Pravachol) 40 mg tablet, Take 1 tablet (40 mg) by mouth once daily at bedtime., Disp: , Rfl:   •  warfarin (Coumadin) 2 mg tablet, Take 1 tablet (2 mg) by mouth., Disp: , Rfl:                      Assessment/Plan   1. Mitral valve stenosis, moderate  Follow Up In Cardiology    Follow Up In Cardiology    ECG 12 Lead      2. Severe pulmonary hypertension (Multi)        3. Essential hypertension        4. Long term current use of anticoagulant therapy        5. BMI 23.0-23.9, adult        6. Never smoked cigarettes                 Scribe Attestation  By signing my name below, I, Sara WHITNEY LPN, Scribe   attest that this documentation has been prepared under the direction and in the presence of Alexey Garza MD.     Provider Attestation - Scribe documentation    All medical record entries made by the Scribe were at my direction and personally dictated by me. I have reviewed the chart and agree that the record accurately reflects my personal performance of the history, physical exam, discussion and plan.

## 2025-10-28 ENCOUNTER — APPOINTMENT (OUTPATIENT)
Dept: CARDIOLOGY | Facility: CLINIC | Age: 85
End: 2025-10-28
Payer: MEDICARE